# Patient Record
Sex: FEMALE | Race: WHITE
[De-identification: names, ages, dates, MRNs, and addresses within clinical notes are randomized per-mention and may not be internally consistent; named-entity substitution may affect disease eponyms.]

---

## 2019-01-23 ENCOUNTER — HOSPITAL ENCOUNTER (EMERGENCY)
Dept: HOSPITAL 10 - FTE | Age: 44
Discharge: HOME | End: 2019-01-23
Payer: COMMERCIAL

## 2019-01-23 ENCOUNTER — HOSPITAL ENCOUNTER (EMERGENCY)
Dept: HOSPITAL 91 - FTE | Age: 44
Discharge: HOME | End: 2019-01-23
Payer: COMMERCIAL

## 2019-01-23 VITALS — HEART RATE: 18 BPM | RESPIRATION RATE: 18 BRPM | SYSTOLIC BLOOD PRESSURE: 99 MMHG | DIASTOLIC BLOOD PRESSURE: 58 MMHG

## 2019-01-23 VITALS
WEIGHT: 192.46 LBS | BODY MASS INDEX: 35.42 KG/M2 | BODY MASS INDEX: 35.42 KG/M2 | HEIGHT: 62 IN | HEIGHT: 62 IN | WEIGHT: 192.46 LBS

## 2019-01-23 DIAGNOSIS — R11.0: ICD-10-CM

## 2019-01-23 DIAGNOSIS — R40.2142: ICD-10-CM

## 2019-01-23 DIAGNOSIS — D25.9: Primary | ICD-10-CM

## 2019-01-23 DIAGNOSIS — D50.9: ICD-10-CM

## 2019-01-23 DIAGNOSIS — R40.2362: ICD-10-CM

## 2019-01-23 DIAGNOSIS — R40.2252: ICD-10-CM

## 2019-01-23 DIAGNOSIS — R10.2: ICD-10-CM

## 2019-01-23 LAB
ADD MAN DIFF?: NO
ADD UMIC: NO
ALANINE AMINOTRANSFERASE: 21 IU/L (ref 13–69)
ALBUMIN/GLOBULIN RATIO: 1.16
ALBUMIN: 4.2 G/DL (ref 3.3–4.9)
ALKALINE PHOSPHATASE: 51 IU/L (ref 42–121)
ANION GAP: 11 (ref 5–13)
ASPARTATE AMINO TRANSFERASE: 27 IU/L (ref 15–46)
BASOPHIL #: 0 10^3/UL (ref 0–0.1)
BASOPHILS %: 0.4 % (ref 0–2)
BILIRUBIN,DIRECT: 0 MG/DL (ref 0–0.2)
BILIRUBIN,TOTAL: 0 MG/DL (ref 0.2–1.3)
BLOOD UREA NITROGEN: 12 MG/DL (ref 7–20)
CALCIUM: 9 MG/DL (ref 8.4–10.2)
CARBON DIOXIDE: 25 MMOL/L (ref 21–31)
CHLORIDE: 105 MMOL/L (ref 97–110)
CREATININE: 0.68 MG/DL (ref 0.44–1)
EOSINOPHILS #: 0.1 10^3/UL (ref 0–0.5)
EOSINOPHILS %: 0.9 % (ref 0–7)
GLOBULIN: 3.6 G/DL (ref 1.3–3.2)
GLUCOSE: 104 MG/DL (ref 70–220)
HEMATOCRIT: 29.9 % (ref 37–47)
HEMOGLOBIN: 8.7 G/DL (ref 12–16)
IMMATURE GRANS #M: 0.02 10^3/UL (ref 0–0.03)
IMMATURE GRANS % (M): 0.3 % (ref 0–0.43)
LIPASE: 133 U/L (ref 23–300)
LYMPHOCYTES #: 2.8 10^3/UL (ref 0.8–2.9)
LYMPHOCYTES %: 36.2 % (ref 15–51)
MEAN CORPUSCULAR HEMOGLOBIN: 20.8 PG (ref 29–33)
MEAN CORPUSCULAR HGB CONC: 29.1 G/DL (ref 32–37)
MEAN CORPUSCULAR VOLUME: 71.4 FL (ref 82–101)
MEAN PLATELET VOLUME: 10.9 FL (ref 7.4–10.4)
MONOCYTE #: 0.7 10^3/UL (ref 0.3–0.9)
MONOCYTES %: 9.4 % (ref 0–11)
NEUTROPHIL #: 4.1 10^3/UL (ref 1.6–7.5)
NEUTROPHILS %: 52.8 % (ref 39–77)
NUCLEATED RED BLOOD CELLS #: 0 10^3/UL (ref 0–0)
NUCLEATED RED BLOOD CELLS%: 0 /100WBC (ref 0–0)
PLATELET COUNT: 370 10^3/UL (ref 140–415)
POTASSIUM: 4.1 MMOL/L (ref 3.5–5.1)
RED BLOOD COUNT: 4.19 10^6/UL (ref 4.2–5.4)
RED CELL DISTRIBUTION WIDTH: 17.6 % (ref 11.5–14.5)
SODIUM: 141 MMOL/L (ref 135–144)
TOTAL PROTEIN: 7.8 G/DL (ref 6.1–8.1)
UR ASCORBIC ACID: 20 MG/DL
UR BILIRUBIN (DIP): NEGATIVE MG/DL
UR BLOOD (DIP): NEGATIVE MG/DL
UR CLARITY: CLEAR
UR COLOR: YELLOW
UR GLUCOSE (DIP): NEGATIVE MG/DL
UR KETONES (DIP): NEGATIVE MG/DL
UR LEUKOCYTE ESTERASE (DIP): NEGATIVE LEU/UL
UR NITRITE (DIP): NEGATIVE MG/DL
UR PH (DIP): 8 (ref 5–9)
UR SPECIFIC GRAVITY (DIP): 1.02 (ref 1–1.03)
UR TOTAL PROTEIN (DIP): NEGATIVE MG/DL
UR UROBILINOGEN (DIP): NEGATIVE MG/DL
WHITE BLOOD COUNT: 7.8 10^3/UL (ref 4.8–10.8)

## 2019-01-23 PROCEDURE — 36415 COLL VENOUS BLD VENIPUNCTURE: CPT

## 2019-01-23 PROCEDURE — 99285 EMERGENCY DEPT VISIT HI MDM: CPT

## 2019-01-23 PROCEDURE — 85025 COMPLETE CBC W/AUTO DIFF WBC: CPT

## 2019-01-23 PROCEDURE — 96375 TX/PRO/DX INJ NEW DRUG ADDON: CPT

## 2019-01-23 PROCEDURE — 81003 URINALYSIS AUTO W/O SCOPE: CPT

## 2019-01-23 PROCEDURE — 74177 CT ABD & PELVIS W/CONTRAST: CPT

## 2019-01-23 PROCEDURE — 81025 URINE PREGNANCY TEST: CPT

## 2019-01-23 PROCEDURE — 83690 ASSAY OF LIPASE: CPT

## 2019-01-23 PROCEDURE — 96374 THER/PROPH/DIAG INJ IV PUSH: CPT

## 2019-01-23 PROCEDURE — 80053 COMPREHEN METABOLIC PANEL: CPT

## 2019-01-23 RX ADMIN — ONDANSETRON HYDROCHLORIDE 1 MG: 2 INJECTION, SOLUTION INTRAMUSCULAR; INTRAVENOUS at 18:15

## 2019-01-23 RX ADMIN — THIAMINE HYDROCHLORIDE 1 MLS/HR: 100 INJECTION, SOLUTION INTRAMUSCULAR; INTRAVENOUS at 18:17

## 2019-01-23 NOTE — ERD
ER Documentation


Chief Complaint


Chief Complaint





RT SIDE AP SEEN AND [facility] LAST WEEK GIVEN ABX





HPI


43-year-old female presents to the emergency department complaining of 


intermittent right sided pelvic pain rating it 8 out of 10 since last week.  


Patient states that about a week ago she went to [facility] for her pelvic pain


in which they did a CT imaging, they did not do an ultrasound.  They stated that


the CT showed possible inflammation in her appendix and a surgical consult was 


done and told her that she will not need surgery and they gave her a 


prescription for Cipro.  Patient did not have her paperwork with her.  Patient 


states that she finished Cipro about a few days prior to being seen.  She admits


to nausea.  Denies fever, vomiting, diarrhea.  Is currently on her menstrual 


period, swelling 8 pads per day.  Patient states that she has very heavy 


menstrual periods and has anemia because of it.  She takes 2 tablets of ferrous 


sulfate per day





ROS


All systems reviewed and are negative except as per history of present illness.





Medications


Home Meds


Active Scripts


Acetaminophen* (Tylenol*) 325 Mg Tablet, 2 TAB PO Q6 PRN for PAIN AND OR 


ELEVATED TEMP, #30 TAB


   Prov:ADAM FAY PA-C         1/23/19


Naproxen* (Naprosyn*) 500 Mg Tablet, 500 MG PO BID PRN for PAIN AND/OR 


INFLAMMATION, #30 TAB


   Prov:ADAM FAY PA-C         1/23/19


Ondansetron (Ondansetron Odt) 8 Mg Tab.rapdis, 8 MG PO Q6H PRN for NAUSEA AND/OR


VOMITING, #30 TAB


   Prov:ADAM FAY PA-C         1/23/19


Reported Medications


Calcium Carbonate (Calcium) 600 Mg Tablet


   9/19/10





Allergies


Allergies:  


Coded Allergies:  


     No Known Allergies (Verified  Allergy, Mild, 9/19/10)





PMhx/Soc


History of Surgery:  Yes (PARATHYROID REMOVAL,PARTIAL THYROID REMOVAL,R 


FALLOPIAN TUBE REMOVE)


Anesthesia Reaction:  No


Hx Neurological Disorder:  No


Hx Respiratory Disorders:  No


Hx Cardiac Disorders:  No


Hx Psychiatric Problems:  No


Hx Miscellaneous Medical Probl:  No


Hx Alcohol Use:  No


Hx Substance Use:  No


Hx Tobacco Use:  No





Physical Exam


Vitals





Vital Signs


  Date      Temp  Pulse  Resp  B/P (MAP)   Pulse Ox  O2          O2 Flow    FiO2


Time                                                 Delivery    Rate


   1/23/19  98.0     18    18  99/58 (72)       100  Room Air


     20:17


   1/23/19  98.8     74    16      138/62       100


     16:07                           (87)





Physical Exam





GENERAL: well-developed/well-nourished, in no apparent distress, non-toxic 


appearing


HENT: NC/AT, moist mucous membranes


EYES: Conjunctiva normal


NECK: Supple, no lymphadenopathy


PULM: CTA bilaterally, no rales, rhonchi, or wheezing heard 


CV: Normal S1S2, RRR, good capillary refill


GI: Soft, non-distended, tender to palpation right pelvic region ,negative 


McBurney's


      Normal bowel sounds, no masses or organomegaly felt on exam


      No gross peritonitis, no bruits


      Negative Rovsing, negative Carrion, negative McBurney's point,     


      Negative CVAT


BACK: No masses


EXT: No clubbing, cyanosis, or edema


NEURO: Alert and Orientated


SKIN: Intact, normal turgor


PSYCH: Normal mood and mentation


Result Diagram:  


1/23/19 1810 1/23/19 1818





Results 24 hrs





Laboratory Tests


Test
                1/23/19
18:00  1/23/19
18:03   1/23/19
18:10  1/23/19
18:18


Urine Color        YELLOW


Urine Clarity      CLEAR


Urine pH                      8.0


Urine Specific              1.020


Gravity


Urine Ketones      NEGATIVE mg/dL


Urine Nitrite      NEGATIVE mg/dL


Urine Bilirubin    NEGATIVE mg/dL


Urine              NEGATIVE mg/dL


Urobilinogen


Urine Leukocyte    NEGATIVE
Abiel/ul  
              
               



Esterase



Urine Hemoglobin   NEGATIVE mg/dL


Urine Glucose      NEGATIVE mg/dL


Urine Total        NEGATIVE mg/dl


Protein


POC Beta HCG,                       NEGATIVE


Qualitative


White Blood Count                                    7.8 10^3/ul


Red Blood Count                                     4.19 10^6/ul


Hemoglobin                                              8.7 g/dl


Hematocrit                                                29.9 %


Mean Corpuscular                                         71.4 fl


Volume


Mean Corpuscular                                         20.8 pg


Hemoglobin


Mean Corpuscular   
                
                 29.1 g/dl 
  



Hemoglobin
Concen


t


Red Cell                                                  17.6 %


Distribution


Width


Platelet Count                                       370 10^3/UL


Mean Platelet                                            10.9 fl


Volume


Immature                                                 0.300 %


Granulocytes %


Neutrophils %                                             52.8 %


Lymphocytes %                                             36.2 %


Monocytes %                                                9.4 %


Eosinophils %                                              0.9 %


Basophils %                                                0.4 %


Nucleated Red                                        0.0 /100WBC


Blood Cells %


Immature                                           0.020 10^3/ul


Granulocytes #


Neutrophils #                                        4.1 10^3/ul


Lymphocytes #                                        2.8 10^3/ul


Monocytes #                                          0.7 10^3/ul


Eosinophils #                                        0.1 10^3/ul


Basophils #                                          0.0 10^3/ul


Nucleated Red                                        0.0 10^3/ul


Blood Cells #


Sodium Level                                                         141 mmol/L


Potassium Level                                                      4.1 mmol/L


Chloride Level                                                       105 mmol/L


Carbon Dioxide                                                        25 mmol/L


Level


Anion Gap                                                                    11


Blood Urea                                                             12 mg/dl


Nitrogen


Creatinine                                                           0.68 mg/dl


Est Glomerular     
                
              
               > 60 mL/min 



Filtrat


Rate
mL/min


Glucose Level                                                         104 mg/dl


Calcium Level                                                         9.0 mg/dl


Total Bilirubin                                                       0.0 mg/dl


Direct Bilirubin                                                     0.00 mg/dl


Indirect                                                              0.0 mg/dl


Bilirubin


Aspartate Amino    
                
              
                   27 IU/L 



Transf
(AST/SGOT)


Alanine            
                
              
                   21 IU/L 



Aminotransferase



(ALT/SGPT)


Alkaline                                                                51 IU/L


Phosphatase


Total Protein                                                          7.8 g/dl


Albumin                                                                4.2 g/dl


Globulin                                                              3.60 g/dl


Albumin/Globulin                                                           1.16


Ratio


Lipase                                                                  133 U/L





Current Medications


 Medications
   Dose
          Sig/Marge
       Start Time
   Status  Last


 (Trade)       Ordered        Route
 PRN     Stop Time              Admin
Dose


                              Reason                                Admin


 Sodium         1,000 ml @ 
   Q1H STAT
      1/23/19       DC           1/23/19


Chloride       1,000 mls/hr   IV
            17:43
                       18:17



                                             1/23/19 18:42


 Morphine       4 mg           ONCE  STAT
    1/23/19       DC           1/23/19


Sulfate
                      IV
            17:43
                       18:17



(morphine)                                   1/23/19 17:45


 Ondansetron    4 mg           ONCE  STAT
    1/23/19       DC           1/23/19


HCl
  (Zofran                 IV
            17:43
                       18:15



Inj)                                         1/23/19 17:45


 IV Flush
      10 ml          STK-MED        1/23/19       DC       



(NS 10 ml)                    ONCE
 .ROUTE
  19:04



                                             1/23/19 19:05


 Sodium         100 ml @ ud    STK-MED        1/23/19       DC       



Chloride                      ONCE
 .ROUTE
  19:04



                                             1/23/19 19:05


 Iohexol
       150 ml         STK-MED        1/23/19       DC       



(Omnipaque                    ONCE
 .ROUTE
  19:04



300mg/
 ml)                                  1/23/19 19:05








Procedures/MDM


43-year-old female presents emerged department with right-sided pelvic pain, 


patient was found to have multiple uterine fibroids.  There was no evidence of 


appendicitis or other acute abdominal condition at this time.  Patient is well-


appearing, afebrile and stable to be discharged home to follow-up with an 


OB/GYN.  In the ED, patient was given fluids and pain has stabilized.  There is 


no evidence of leukocytosis.  No evidence of infection in her urine.  CT abdomen


and pelvis was done and radiologist stated -





1.  Several large uterine fibroids largest at the posterior fundus measures 5.2 


cm.


2.  Suspect layering gallstones.


3.  Mild hepatomegaly.





Patient had a hemoglobin of 8.7, I have instructed her to continue taking her 


ferrous sulfate, I discussed with her that she may increase her dose to 3 times 


a day.  Discussed with her to return to emergency room any worsening symptoms.  


She understands agrees this plan





Departure


Diagnosis:  


   Primary Impression:  


   Uterine fibroid


   Additional Impressions:  


   Microcytic anemia


   Abdominal pain


   Nausea


Condition:  Stable


Patient Instructions:  Abdominal Pain, Understanding Uterine Bleeding, Nausea


Referrals:  


PAULO HICKEY (PCP)





Additional Instructions:  


FOLLOW UP WITH YOUR PRIMARY CARE PHYSICIAN TOMORROW.Return to this facility if 


you are not improving as expected.








Take all medicines as directed.








Return to this facility if you are not improving as expected.











ADAM FAY PA-C      Jan 23, 2019 23:02